# Patient Record
Sex: FEMALE | Race: WHITE | NOT HISPANIC OR LATINO | URBAN - METROPOLITAN AREA
[De-identification: names, ages, dates, MRNs, and addresses within clinical notes are randomized per-mention and may not be internally consistent; named-entity substitution may affect disease eponyms.]

---

## 2019-04-30 ENCOUNTER — Encounter (OUTPATIENT)
Dept: URBAN - METROPOLITAN AREA CLINIC 72 | Facility: CLINIC | Age: 69
End: 2019-04-30
Payer: MEDICARE

## 2019-04-30 PROCEDURE — 99204 OFFICE O/P NEW MOD 45 MIN: CPT | Performed by: OPTOMETRIST

## 2019-04-30 PROCEDURE — 92014 COMPRE OPH EXAM EST PT 1/>: CPT | Performed by: OPTOMETRIST

## 2019-04-30 PROCEDURE — 92250 FUNDUS PHOTOGRAPHY W/I&R: CPT | Performed by: OPTOMETRIST

## 2020-01-20 ENCOUNTER — Encounter (OUTPATIENT)
Dept: URBAN - METROPOLITAN AREA CLINIC 72 | Facility: CLINIC | Age: 70
End: 2020-01-20
Payer: MEDICARE

## 2020-01-20 PROCEDURE — 92134 CPTRZ OPH DX IMG PST SGM RTA: CPT | Performed by: OPTOMETRIST

## 2020-01-20 PROCEDURE — 92014 COMPRE OPH EXAM EST PT 1/>: CPT | Performed by: OPTOMETRIST

## 2020-02-25 ENCOUNTER — OFFICE VISIT (OUTPATIENT)
Dept: URBAN - METROPOLITAN AREA CLINIC 72 | Facility: CLINIC | Age: 70
End: 2020-02-25
Payer: MEDICARE

## 2020-02-25 DIAGNOSIS — H25.13 AGE-RELATED NUCLEAR CATARACT, BILATERAL: ICD-10-CM

## 2020-02-25 DIAGNOSIS — H25.813 COMBINED FORMS OF AGE-RELATED CATARACT, BILATERAL: Primary | ICD-10-CM

## 2020-02-25 DIAGNOSIS — H52.13 MYOPIA, BILATERAL: ICD-10-CM

## 2020-02-25 DIAGNOSIS — H43.813 VITREOUS DEGENERATION, BILATERAL: ICD-10-CM

## 2020-02-25 DIAGNOSIS — H52.4 PRESBYOPIA: ICD-10-CM

## 2020-02-25 DIAGNOSIS — D31.32 BENIGN NEOPLASM OF LEFT CHOROID: ICD-10-CM

## 2020-02-25 PROCEDURE — 92133 CPTRZD OPH DX IMG PST SGM ON: CPT | Performed by: OPTOMETRIST

## 2020-02-25 PROCEDURE — 92134 CPTRZ OPH DX IMG PST SGM RTA: CPT | Performed by: OPTOMETRIST

## 2020-02-25 PROCEDURE — 92014 COMPRE OPH EXAM EST PT 1/>: CPT | Performed by: OPTOMETRIST

## 2020-02-25 ASSESSMENT — VISUAL ACUITY
OS: 20/25
OD: 20/50

## 2020-02-25 ASSESSMENT — INTRAOCULAR PRESSURE
OS: 11
OD: 11

## 2020-02-25 NOTE — IMPRESSION/PLAN
Impression: Benign neoplasm of left choroid: D31.32. Plan: OS: Discussed diagnosis in detail with patient. No treatment is required at this time. Will continue to observe condition and or symptoms.

## 2020-02-25 NOTE — IMPRESSION/PLAN
Impression: Vitreous degeneration, bilateral: H43.813. Plan: Cataracts account for the patient's complaints. Discussed all risks, benefits, procedures and recovery. Patient understands changing glasses will not improve vision. Patient desires to have surgery, recommend phacoemulsification with intraocular lens.

## 2020-03-11 ENCOUNTER — OFFICE VISIT (OUTPATIENT)
Dept: URBAN - METROPOLITAN AREA CLINIC 71 | Facility: CLINIC | Age: 70
End: 2020-03-11
Payer: MEDICARE

## 2020-03-11 DIAGNOSIS — H31.22 CHOROIDAL DYSTROPHY (CENTRAL AREOLAR) (GENERALIZED) (PERIPAPILLARY): ICD-10-CM

## 2020-03-11 DIAGNOSIS — H52.223 REGULAR ASTIGMATISM, BILATERAL: ICD-10-CM

## 2020-03-11 PROCEDURE — 92014 COMPRE OPH EXAM EST PT 1/>: CPT | Performed by: OPHTHALMOLOGY

## 2020-03-11 PROCEDURE — 92136 OPHTHALMIC BIOMETRY: CPT | Performed by: OPHTHALMOLOGY

## 2020-03-11 RX ORDER — CIPROFLOXACIN HYDROCHLORIDE 3.5 MG/ML
0.3 % SOLUTION/ DROPS TOPICAL
Qty: 1 | Refills: 1 | Status: INACTIVE
Start: 2020-03-11 | End: 2021-04-26

## 2020-03-11 ASSESSMENT — KERATOMETRY
OD: 44.66
OS: 44.63
OS: 44.77
OD: 44.72

## 2020-03-11 ASSESSMENT — INTRAOCULAR PRESSURE
OD: 10
OS: 11

## 2020-03-11 ASSESSMENT — PACHYMETRY
OS: 25.88
OD: 25.40
OS: 3.94
OD: 3.88

## 2020-03-11 NOTE — IMPRESSION/PLAN
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  Plan: NOTED

## 2020-03-11 NOTE — IMPRESSION/PLAN
Impression: Combined forms of age-related cataract, bilateral: H25.813. Plan: The following IOL options were discussed with the patient: Standard distance IOL : gives good vision at distance, but glasses will be needed for reading and other near tasks. Standard near or reading IOL : gives good vision at near, but glasses will be needed for driving and distance tasks. Multifocal IOL : gives good vision at distance and near, and makes the patient more independent of the need for glasses throughout the day. Toric IOL for distance: corrects astigmatism, and usually gives good distance vision without glasses, but glasses for reading are still needed. Toric IOL for near : corrects astigmatism, and usually gives good near or reading vision without glasses, but glasses for driving and distance tasks will be needed. Toric multifocal IOL : gives good vision at distance and near, and makes the patient more independent of the need for glasses throughout the day.  Monovision IOL : this involves inserting a distance IOL in one eye and a reading IOL in the other, to provide les

## 2020-03-11 NOTE — IMPRESSION/PLAN
Impression: Regular astigmatism, bilateral: H52.223. Plan: Astigmatism Overview: If there is a moderate or significant amount of astigmatism in the eye before cataract surgery, this may limit the vision without glasses after surgery. The following options were discussed to correct the astigmatism: Limbal relaxing incisions: are incisions in the cornea performed at the time of surgery to reduce the amount of astigmatism. They made be created with a hand held valeriy blade or the femtosecond laser. Toric IOL : is a lens implant that can correct astigmatism. Astigmatism Counseling Other : when cataracts are removed, a lens implant is placed inside the eye. There are now choices as to the type of lens implant to use. Explained why preop astigmatism may limit uncorrected vision postop and options for astigmatism correction including Toric IOL, LRI, LASIK and PRK. Discussed with pt that for the first month until she gets her new prescription, she may be more frustrated due to not correcting her astigmatism.  Pt understands and elects to move forwar

## 2020-04-28 ENCOUNTER — SURGERY (OUTPATIENT)
Dept: URBAN - METROPOLITAN AREA SURGERY 45 | Facility: SURGERY | Age: 70
End: 2020-04-28
Payer: MEDICARE

## 2020-04-28 PROCEDURE — G8907 PT DOC NO EVENTS ON DISCHARG: HCPCS | Performed by: CLINIC/CENTER

## 2020-04-28 PROCEDURE — G8918 PT W/O PREOP ORDER IV AB PRO: HCPCS | Performed by: CLINIC/CENTER

## 2020-06-09 ENCOUNTER — SURGERY (OUTPATIENT)
Dept: URBAN - METROPOLITAN AREA SURGERY 44 | Facility: SURGERY | Age: 70
End: 2020-06-09
Payer: MEDICARE

## 2020-06-09 PROCEDURE — 66984 XCAPSL CTRC RMVL W/O ECP: CPT | Performed by: OPHTHALMOLOGY

## 2020-06-10 ENCOUNTER — POST-OPERATIVE VISIT (OUTPATIENT)
Dept: URBAN - METROPOLITAN AREA CLINIC 71 | Facility: CLINIC | Age: 70
End: 2020-06-10

## 2020-06-10 PROCEDURE — 99024 POSTOP FOLLOW-UP VISIT: CPT | Performed by: OPHTHALMOLOGY

## 2020-06-16 ENCOUNTER — SURGERY (OUTPATIENT)
Dept: URBAN - METROPOLITAN AREA SURGERY 44 | Facility: SURGERY | Age: 70
End: 2020-06-16
Payer: MEDICARE

## 2020-06-16 ENCOUNTER — SURGERY (OUTPATIENT)
Dept: URBAN - METROPOLITAN AREA SURGERY 45 | Facility: SURGERY | Age: 70
End: 2020-06-16
Payer: MEDICARE

## 2020-06-16 PROCEDURE — 66984 XCAPSL CTRC RMVL W/O ECP: CPT | Performed by: OPHTHALMOLOGY

## 2020-06-16 PROCEDURE — G8907 PT DOC NO EVENTS ON DISCHARG: HCPCS | Performed by: CLINIC/CENTER

## 2020-06-16 PROCEDURE — G8918 PT W/O PREOP ORDER IV AB PRO: HCPCS | Performed by: CLINIC/CENTER

## 2020-06-17 ENCOUNTER — POST-OPERATIVE VISIT (OUTPATIENT)
Dept: URBAN - METROPOLITAN AREA CLINIC 71 | Facility: CLINIC | Age: 70
End: 2020-06-17

## 2020-06-17 PROCEDURE — 99024 POSTOP FOLLOW-UP VISIT: CPT | Performed by: OPHTHALMOLOGY

## 2020-06-17 ASSESSMENT — INTRAOCULAR PRESSURE
OS: 9
OD: 8

## 2020-06-17 NOTE — IMPRESSION/PLAN
Impression: S/P IOL SA60AT CAREPLUS FAR/ [facility]A OS - 1 Day. Presence of intraocular lens  Z96.1. Plan: looks god - healing well.  ok to proceed with PO REF in 4 weeks with Mini Lutz --Continue CIPRO 0.3%

## 2020-07-21 ENCOUNTER — POST-OPERATIVE VISIT (OUTPATIENT)
Dept: URBAN - METROPOLITAN AREA CLINIC 72 | Facility: CLINIC | Age: 70
End: 2020-07-21

## 2020-07-21 ASSESSMENT — VISUAL ACUITY
OS: 20/20
OD: 20/20

## 2020-07-21 ASSESSMENT — INTRAOCULAR PRESSURE
OD: 15
OS: 10

## 2020-07-21 NOTE — IMPRESSION/PLAN
Impression: S/P CE/Standard IOL OU - . Presence of intraocular lens  Z96.1. Plan: A glasses Rx has been generated and dispensed today. Pt to call with any concerns.

## 2021-04-26 ENCOUNTER — OFFICE VISIT (OUTPATIENT)
Dept: URBAN - METROPOLITAN AREA CLINIC 71 | Facility: CLINIC | Age: 71
End: 2021-04-26
Payer: MEDICARE

## 2021-04-26 DIAGNOSIS — Z96.1 PRESENCE OF INTRAOCULAR LENS: ICD-10-CM

## 2021-04-26 PROCEDURE — 99213 OFFICE O/P EST LOW 20 MIN: CPT | Performed by: OPTOMETRIST

## 2021-04-26 ASSESSMENT — INTRAOCULAR PRESSURE
OS: 9
OD: 8

## 2021-04-26 NOTE — IMPRESSION/PLAN
Impression: Vitreous degeneration, bilateral: H43.813. Stable OU. Plan: Continue to monitor with DE yearly.

## 2022-05-09 ENCOUNTER — OFFICE VISIT (OUTPATIENT)
Dept: URBAN - METROPOLITAN AREA CLINIC 71 | Facility: CLINIC | Age: 72
End: 2022-05-09
Payer: MEDICARE

## 2022-05-09 DIAGNOSIS — H43.813 VITREOUS DEGENERATION, BILATERAL: Primary | ICD-10-CM

## 2022-05-09 DIAGNOSIS — H50.52 EXOPHORIA: ICD-10-CM

## 2022-05-09 DIAGNOSIS — Z96.1 PRESENCE OF INTRAOCULAR LENS: ICD-10-CM

## 2022-05-09 PROCEDURE — 92014 COMPRE OPH EXAM EST PT 1/>: CPT | Performed by: OPTOMETRIST

## 2022-05-09 ASSESSMENT — INTRAOCULAR PRESSURE
OS: 10
OD: 11

## 2022-05-09 NOTE — IMPRESSION/PLAN
Impression: Vitreous degeneration, bilateral: H43.813. Stable OU, no holes or tears observed on today's exam. Plan: Continue to monitor with annual DE. Call with new symptoms or concerns.

## 2023-03-02 ENCOUNTER — OFFICE VISIT (OUTPATIENT)
Dept: URBAN - METROPOLITAN AREA CLINIC 71 | Facility: CLINIC | Age: 73
End: 2023-03-02
Payer: MEDICARE

## 2023-03-02 DIAGNOSIS — Z96.1 PRESENCE OF INTRAOCULAR LENS: ICD-10-CM

## 2023-03-02 DIAGNOSIS — H43.813 VITREOUS DEGENERATION, BILATERAL: Primary | ICD-10-CM

## 2023-03-02 DIAGNOSIS — H35.432 PAVING STONE DEGENERATION OF RETINA, LEFT EYE: ICD-10-CM

## 2023-03-02 PROCEDURE — 92014 COMPRE OPH EXAM EST PT 1/>: CPT | Performed by: OPTOMETRIST

## 2023-03-02 ASSESSMENT — INTRAOCULAR PRESSURE
OS: 7
OD: 10

## 2023-03-02 NOTE — IMPRESSION/PLAN
Impression: Vitreous degeneration, bilateral: H43.813. PVD stable OU, no holes or tears observed on today's exam. Plan: Continue to monitor with DE yearly. Call with new symptoms or concerns.

## 2024-03-07 ENCOUNTER — OFFICE VISIT (OUTPATIENT)
Dept: URBAN - METROPOLITAN AREA CLINIC 71 | Facility: CLINIC | Age: 74
End: 2024-03-07
Payer: MEDICARE

## 2024-03-07 DIAGNOSIS — H43.813 VITREOUS DEGENERATION, BILATERAL: Primary | ICD-10-CM

## 2024-03-07 DIAGNOSIS — Z96.1 PRESENCE OF INTRAOCULAR LENS: ICD-10-CM

## 2024-03-07 DIAGNOSIS — H35.433 PAVING STONE DEGENERATION OF RETINA, BILATERAL: ICD-10-CM

## 2024-03-07 PROCEDURE — 99214 OFFICE O/P EST MOD 30 MIN: CPT | Performed by: OPTOMETRIST

## 2024-03-07 ASSESSMENT — INTRAOCULAR PRESSURE
OS: 10
OD: 10

## 2025-04-29 ENCOUNTER — OFFICE VISIT (OUTPATIENT)
Dept: URBAN - METROPOLITAN AREA CLINIC 71 | Facility: CLINIC | Age: 75
End: 2025-04-29
Payer: MEDICARE

## 2025-04-29 DIAGNOSIS — H35.433 PAVING STONE DEGENERATION OF RETINA, BILATERAL: ICD-10-CM

## 2025-04-29 DIAGNOSIS — H43.813 VITREOUS DEGENERATION, BILATERAL: Primary | ICD-10-CM

## 2025-04-29 DIAGNOSIS — H26.491 OTHER SECONDARY CATARACT, RIGHT EYE: ICD-10-CM

## 2025-04-29 DIAGNOSIS — Z96.1 PRESENCE OF INTRAOCULAR LENS: ICD-10-CM

## 2025-04-29 PROCEDURE — 99213 OFFICE O/P EST LOW 20 MIN: CPT

## 2025-04-29 ASSESSMENT — INTRAOCULAR PRESSURE
OD: 11
OS: 9